# Patient Record
Sex: FEMALE | Race: BLACK OR AFRICAN AMERICAN | NOT HISPANIC OR LATINO
[De-identification: names, ages, dates, MRNs, and addresses within clinical notes are randomized per-mention and may not be internally consistent; named-entity substitution may affect disease eponyms.]

---

## 2019-09-03 PROBLEM — Z00.00 ENCOUNTER FOR PREVENTIVE HEALTH EXAMINATION: Status: ACTIVE | Noted: 2019-09-03

## 2019-09-23 ENCOUNTER — APPOINTMENT (OUTPATIENT)
Dept: NEUROSURGERY | Facility: CLINIC | Age: 64
End: 2019-09-23
Payer: COMMERCIAL

## 2019-09-23 VITALS
HEART RATE: 96 BPM | HEIGHT: 65 IN | SYSTOLIC BLOOD PRESSURE: 125 MMHG | DIASTOLIC BLOOD PRESSURE: 79 MMHG | WEIGHT: 158 LBS | BODY MASS INDEX: 26.33 KG/M2

## 2019-09-23 DIAGNOSIS — I10 ESSENTIAL (PRIMARY) HYPERTENSION: ICD-10-CM

## 2019-09-23 DIAGNOSIS — M48.062 SPINAL STENOSIS, LUMBAR REGION WITH NEUROGENIC CLAUDICATION: ICD-10-CM

## 2019-09-23 DIAGNOSIS — C80.1 MALIGNANT (PRIMARY) NEOPLASM, UNSPECIFIED: ICD-10-CM

## 2019-09-23 DIAGNOSIS — E11.9 TYPE 2 DIABETES MELLITUS W/OUT COMPLICATIONS: ICD-10-CM

## 2019-09-23 DIAGNOSIS — Z87.891 PERSONAL HISTORY OF NICOTINE DEPENDENCE: ICD-10-CM

## 2019-09-23 PROCEDURE — 99203 OFFICE O/P NEW LOW 30 MIN: CPT

## 2019-09-25 PROBLEM — E11.9 DIABETES: Status: RESOLVED | Noted: 2019-09-25 | Resolved: 2019-09-25

## 2019-09-25 PROBLEM — Z87.891 FORMER SMOKER: Status: ACTIVE | Noted: 2019-09-25

## 2019-09-25 PROBLEM — I10 HIGH BLOOD PRESSURE: Status: RESOLVED | Noted: 2019-09-25 | Resolved: 2019-09-25

## 2019-09-25 PROBLEM — C80.1 CANCER: Status: RESOLVED | Noted: 2019-09-25 | Resolved: 2019-09-25

## 2019-09-25 RX ORDER — CYCLOBENZAPRINE HYDROCHLORIDE 10 MG/1
10 TABLET, FILM COATED ORAL
Refills: 0 | Status: ACTIVE | COMMUNITY

## 2019-09-25 RX ORDER — SITAGLIPTIN AND METFORMIN HYDROCHLORIDE 50; 1000 MG/1; MG/1
50-1000 TABLET, FILM COATED ORAL
Refills: 0 | Status: ACTIVE | COMMUNITY

## 2019-09-25 RX ORDER — LETROZOLE TABLETS 2.5 MG/1
TABLET, FILM COATED ORAL
Refills: 0 | Status: ACTIVE | COMMUNITY

## 2019-09-25 RX ORDER — DICLOFENAC SODIUM 50 MG/1
50 TABLET, DELAYED RELEASE ORAL
Refills: 0 | Status: ACTIVE | COMMUNITY

## 2019-09-25 RX ORDER — PRAVASTATIN SODIUM 40 MG/1
40 TABLET ORAL
Refills: 0 | Status: ACTIVE | COMMUNITY

## 2019-09-25 RX ORDER — GUAIFENESIN 1200 MG/1
TABLET, EXTENDED RELEASE ORAL
Refills: 0 | Status: ACTIVE | COMMUNITY

## 2019-09-25 RX ORDER — CANDESARTAN CILEXETIL AND HYDROCHLOROTHIAZIDE 32; 12.5 MG/1; MG/1
32-12.5 TABLET ORAL
Refills: 0 | Status: ACTIVE | COMMUNITY

## 2019-09-25 RX ORDER — DAPAGLIFLOZIN 10 MG/1
TABLET, FILM COATED ORAL
Refills: 0 | Status: ACTIVE | COMMUNITY

## 2019-09-26 NOTE — HISTORY OF PRESENT ILLNESS
[de-identified] : Ms. KEELY GODOY is a 64 yo F with PMH of breast ca (s/p right lumpectomy and RT in 9/2016, currently on Letrozole), HTN, T2DM presents for initial consultation. She c/o chronic back pain for ~3 years which has been progressively worsening for the past several months. She describes pain as constant severe (10/10) pressure/tightness on her lower back radiating to left buttock down to lateral side of left leg and right distal calf with intermittent tingling/hot and cold sensation on b/l feet. Pain is worsened by walking and alleviated by resting. Previous/current treatments include physical therapy (tried 2 times which helped moderately), brace (no relief), medications(Tylenol #2, Alleve, Flexeril- minimal to no relief). MRI L-spine was ordered by PCP and referred for surgical consult. She denies headache, dizziness, visual changes, saddle anesthesia, weakness on LE, bowel/bladder dysfunction. She ambulates without an assistive device and performs ADLs independently.

## 2019-09-26 NOTE — REVIEW OF SYSTEMS
[As Noted in HPI] : as noted in HPI [Negative] : Integumentary [FreeTextEntry9] : back pain radiating to b/l LE (L>R)

## 2019-09-26 NOTE — DATA REVIEWED
[de-identified] : L-spine wo on 6/14/19 @ [facility] radiology [de-identified] : LS AP/Lat on 6/14/19 @ [facility] radiology

## 2019-09-26 NOTE — ASSESSMENT
[FreeTextEntry1] : This is a 62 yo female with chronic back pain with radiculopathy. She demonstrates mild dysesthesia on b/l toes but otherwise unremarkable. Recent MRI L-spine showed mild multilevel stenosis most prominent @L4-S1 with tarlov cyst. I discussed with the patient for recent stable image and recommend to continue conservative management at this time. \par - continue PT/medications\par - refer to pain management\par - EMG on LE for radiculopathy\par - RTC in 3 months after EMG for new/worsening symptoms

## 2019-09-26 NOTE — PHYSICAL EXAM
[General Appearance - In No Acute Distress] : in no acute distress [General Appearance - Alert] : alert [General Appearance - Well Nourished] : well nourished [] : normal voice and communication [Impaired Insight] : insight and judgment were intact [Oriented To Time, Place, And Person] : oriented to person, place, and time [Memory Recent] : recent memory was not impaired [Affect] : the affect was normal [Person] : oriented to person [Place] : oriented to place [Time] : oriented to time [Short Term Intact] : short term memory intact [Remote Intact] : remote memory intact [Span Intact] : the attention span was normal [Registration Intact] : recent registration memory intact [Concentration Intact] : normal concentrating ability [Fluency] : fluency intact [Comprehension] : comprehension intact [Reading] : reading intact [Current Events] : adequate knowledge of current events [Past History] : adequate knowledge of personal past history [Vocabulary] : adequate range of vocabulary [Cranial Nerves Optic (II)] : visual acuity intact bilaterally,  pupils equal round and reactive to light [Cranial Nerves Trigeminal (V)] : facial sensation intact symmetrically [Cranial Nerves Oculomotor (III)] : extraocular motion intact [Cranial Nerves Vestibulocochlear (VIII)] : hearing was intact bilaterally [Cranial Nerves Glossopharyngeal (IX)] : tongue and palate midline [Cranial Nerves Facial (VII)] : face symmetrical [Cranial Nerves Hypoglossal (XII)] : there was no tongue deviation with protrusion [Cranial Nerves Accessory (XI - Cranial And Spinal)] : head turning and shoulder shrug symmetric [Motor Strength] : muscle strength was normal in all four extremities [Romberg's Sign] : a positive Romberg's sign was present [Dysesthesia] : dysesthesia was present [Balance] : balance was intact [1+] : Patella left 1+ [No Visual Abnormalities] : no visible abnormailities [Able to toe walk] : the patient was able to toe walk [Normal] : normal [Able to heel walk] : the patient was able to heel walk [PERRL With Normal Accommodation] : pupils were equal in size, round, reactive to light, with normal accommodation [Extraocular Movements] : extraocular movements were intact [Hearing Threshold Finger Rub Not [place]] : hearing was normal [Examination Of The Oral Cavity] : the lips and gums were normal [Edema] : there was no peripheral edema [No CVA Tenderness] : no ~M costovertebral angle tenderness [Abnormal Walk] : normal gait [Motor Tone] : muscle strength and tone were normal [FreeTextEntry1] : moderate tenderness on lower back

## 2019-11-13 ENCOUNTER — APPOINTMENT (OUTPATIENT)
Dept: PAIN MANAGEMENT | Facility: CLINIC | Age: 64
End: 2019-11-13
Payer: COMMERCIAL

## 2019-11-13 VITALS
SYSTOLIC BLOOD PRESSURE: 89 MMHG | HEIGHT: 64 IN | HEART RATE: 102 BPM | WEIGHT: 153 LBS | BODY MASS INDEX: 26.12 KG/M2 | DIASTOLIC BLOOD PRESSURE: 60 MMHG

## 2019-11-13 VITALS — DIASTOLIC BLOOD PRESSURE: 72 MMHG | SYSTOLIC BLOOD PRESSURE: 113 MMHG | HEART RATE: 98 BPM

## 2019-11-13 PROCEDURE — 99215 OFFICE O/P EST HI 40 MIN: CPT

## 2019-11-13 RX ORDER — TOPIRAMATE 25 MG/1
25 CAPSULE, EXTENDED RELEASE ORAL DAILY
Qty: 60 | Refills: 1 | Status: ACTIVE | COMMUNITY
Start: 2019-11-13 | End: 1900-01-01

## 2019-11-21 NOTE — PHYSICAL EXAM
[Affect] : the affect was normal [General Appearance - Alert] : alert [Person] : oriented to person [Place] : oriented to place [Cranial Nerves Oculomotor (III)] : extraocular motion intact [Time] : oriented to time [Motor Tone] : muscle tone was normal in all four extremities [Motor Strength] : muscle strength was normal in all four extremities [Cranial Nerves Facial (VII)] : face symmetrical [Outer Ear] : the ears and nose were normal in appearance [Sclera] : the sclera and conjunctiva were normal [Neck Appearance] : the appearance of the neck was normal [] : no rash [FreeTextEntry1] : SLRT NEG BILATER AT 30 - 45 degrees; Palpable spasm and pain at Left lateral paraspinal region at L4-5 level

## 2019-11-21 NOTE — ASSESSMENT
[FreeTextEntry1] : L5 radiculopathy\par \par Non focal exam\par \par Consider local trigger point injection vs GARCÍA\par Discussed other treatment options.\par Recommend PT
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
## 2019-12-04 ENCOUNTER — APPOINTMENT (OUTPATIENT)
Dept: PAIN MANAGEMENT | Facility: CLINIC | Age: 64
End: 2019-12-04

## 2020-01-15 ENCOUNTER — APPOINTMENT (OUTPATIENT)
Dept: PAIN MANAGEMENT | Facility: CLINIC | Age: 65
End: 2020-01-15

## 2020-01-17 ENCOUNTER — APPOINTMENT (OUTPATIENT)
Dept: PAIN MANAGEMENT | Facility: CLINIC | Age: 65
End: 2020-01-17
Payer: COMMERCIAL

## 2020-01-17 VITALS — HEIGHT: 64 IN

## 2020-01-17 PROCEDURE — 99213 OFFICE O/P EST LOW 20 MIN: CPT | Mod: 25

## 2020-01-17 PROCEDURE — 20552 NJX 1/MLT TRIGGER POINT 1/2: CPT

## 2020-01-17 NOTE — ASSESSMENT
[FreeTextEntry1] : Trigger point injections with lidocaine only done today . \par pt tolerated injections well.\par RTO 1 month for repeat TPI . \par \par Dr Daniel in site , billed incident to service. \par

## 2020-01-17 NOTE — REVIEW OF SYSTEMS
[Fever] : no fever [Chills] : no chills [Palpitations] : no palpitations [Shortness Of Breath] : no shortness of breath [Chest Pain] : no chest pain [Dizziness] : no dizziness [Fainting] : no fainting

## 2020-01-17 NOTE — HISTORY OF PRESENT ILLNESS
[FreeTextEntry1] : Pt is here today for a trigger point injections to lower back , as per Dr Daniel. \par Pt has lower back pain that radiates down both legs. \par Discussed trigger point procedure and medication. \par Will hold steroid and just administer lidocaine as pt has DM. Pt is in agreement with this plan. \par Discussed possible adverse effects including bleeding , bruising , infection.\par All questions answered , consent was signed. \par

## 2020-01-17 NOTE — PHYSICAL EXAM
[General Appearance - Alert] : alert [General Appearance - Well-Appearing] : healthy appearing [] : normal voice and communication [Oriented To Time, Place, And Person] : oriented to person, place, and time [Affect] : the affect was normal [Mood] : the mood was normal [Sclera] : the sclera and conjunctiva were normal [Edema] : there was no peripheral edema [Abnormal Walk] : normal gait

## 2020-01-17 NOTE — PROCEDURE
[FreeTextEntry1] : Gloves donned .triggered points identified to bilateral lumbar paraspinal area by pt . Area cleaned with alcohol.\par 1.5cc of 1  Percent lidocaine administered to both sites. Band Aids applied .\par Pt monitored in office for 15 minutes and then walked out of office without complaint or issue. \par

## 2020-02-14 ENCOUNTER — APPOINTMENT (OUTPATIENT)
Dept: PAIN MANAGEMENT | Facility: CLINIC | Age: 65
End: 2020-02-14
Payer: COMMERCIAL

## 2020-02-14 VITALS
HEIGHT: 64 IN | SYSTOLIC BLOOD PRESSURE: 110 MMHG | DIASTOLIC BLOOD PRESSURE: 69 MMHG | WEIGHT: 150 LBS | BODY MASS INDEX: 25.61 KG/M2 | HEART RATE: 86 BPM

## 2020-02-14 PROCEDURE — 99213 OFFICE O/P EST LOW 20 MIN: CPT | Mod: 25

## 2020-02-14 PROCEDURE — 20552 NJX 1/MLT TRIGGER POINT 1/2: CPT

## 2020-02-14 RX ORDER — LIDOCAINE 5% 700 MG/1
5 PATCH TOPICAL
Qty: 30 | Refills: 1 | Status: ACTIVE | COMMUNITY
Start: 2020-02-14 | End: 1900-01-01

## 2020-02-18 NOTE — HISTORY OF PRESENT ILLNESS
[FreeTextEntry1] : Patient returns today for trigger point injection to lumbar spine area. \par She tolerated the injections well last month and reported decreased pain over the course of this month . \par Denies any adverse reactions. \par Pt would liketo go forward with TPI today . \par All questions answered . Consent signed.

## 2020-03-10 ENCOUNTER — APPOINTMENT (OUTPATIENT)
Dept: PAIN MANAGEMENT | Facility: CLINIC | Age: 65
End: 2020-03-10
Payer: COMMERCIAL

## 2020-03-10 VITALS
WEIGHT: 148 LBS | HEIGHT: 64 IN | HEART RATE: 78 BPM | SYSTOLIC BLOOD PRESSURE: 92 MMHG | BODY MASS INDEX: 25.27 KG/M2 | DIASTOLIC BLOOD PRESSURE: 61 MMHG

## 2020-03-10 PROCEDURE — 99213 OFFICE O/P EST LOW 20 MIN: CPT

## 2020-03-10 NOTE — ASSESSMENT
[FreeTextEntry1] : Will hold off today on doing TPI .\par Pt to consult with PCP regarding BP .\par Hold off on using Lidocaine patches as well.\par \par Dr Daniel on site , billed incident to service . \par \par \par RTO after speaking with PCP re: BP

## 2020-03-10 NOTE — HISTORY OF PRESENT ILLNESS
[FreeTextEntry1] : Pt is here today requesting TPI to lower back . \par she reports it has been helpful for her  lower back pain and leg pain . \par However her BP today is 92/60. I advised pt I am not comfortable proceeding with the injection due to the risk of hypotension. Pt verbalized understanding. I advised pt to contact her PCP to discuss.

## 2020-03-10 NOTE — REVIEW OF SYSTEMS
[Fever] : no fever [Chills] : no chills [Chest Pain] : no chest pain [Shortness Of Breath] : no shortness of breath [Dizziness] : no dizziness [Fainting] : no fainting

## 2020-03-10 NOTE — PHYSICAL EXAM
[General Appearance - Alert] : alert [General Appearance - In No Acute Distress] : in no acute distress [General Appearance - Well-Appearing] : healthy appearing [] : normal voice and communication [Oriented To Time, Place, And Person] : oriented to person, place, and time [Affect] : the affect was normal [Mood] : the mood was normal [Motor Strength Lower Extremities Bilaterally] : strength was normal in both lower extremities [Sclera] : the sclera and conjunctiva were normal [Edema] : there was no peripheral edema

## 2023-02-20 ENCOUNTER — INPATIENT (INPATIENT)
Facility: HOSPITAL | Age: 68
LOS: 0 days | Discharge: HOME | DRG: 392 | End: 2023-02-21
Attending: INTERNAL MEDICINE | Admitting: STUDENT IN AN ORGANIZED HEALTH CARE EDUCATION/TRAINING PROGRAM
Payer: COMMERCIAL

## 2023-02-20 VITALS
WEIGHT: 156.09 LBS | SYSTOLIC BLOOD PRESSURE: 119 MMHG | DIASTOLIC BLOOD PRESSURE: 78 MMHG | HEIGHT: 64 IN | RESPIRATION RATE: 20 BRPM | TEMPERATURE: 99 F | HEART RATE: 101 BPM | OXYGEN SATURATION: 97 %

## 2023-02-20 LAB
ALBUMIN SERPL ELPH-MCNC: 4.2 G/DL — SIGNIFICANT CHANGE UP (ref 3.3–5)
ALP SERPL-CCNC: 85 U/L — SIGNIFICANT CHANGE UP (ref 40–120)
ALT FLD-CCNC: 24 U/L — SIGNIFICANT CHANGE UP (ref 10–45)
ANION GAP SERPL CALC-SCNC: 15 MMOL/L — SIGNIFICANT CHANGE UP (ref 5–17)
APTT BLD: 25.3 SEC — LOW (ref 27.5–35.5)
AST SERPL-CCNC: 37 U/L — SIGNIFICANT CHANGE UP (ref 10–40)
BASE EXCESS BLDV CALC-SCNC: 3 MMOL/L — SIGNIFICANT CHANGE UP (ref -2–3)
BASOPHILS # BLD AUTO: 0 K/UL — SIGNIFICANT CHANGE UP (ref 0–0.2)
BASOPHILS NFR BLD AUTO: 0 % — SIGNIFICANT CHANGE UP (ref 0–2)
BILIRUB SERPL-MCNC: 0.5 MG/DL — SIGNIFICANT CHANGE UP (ref 0.2–1.2)
BLOOD GAS VENOUS - CREATININE: SIGNIFICANT CHANGE UP MG/DL (ref 0.5–1.3)
BUN SERPL-MCNC: 11 MG/DL — SIGNIFICANT CHANGE UP (ref 7–23)
CA-I SERPL-SCNC: 1.16 MMOL/L — SIGNIFICANT CHANGE UP (ref 1.15–1.33)
CALCIUM SERPL-MCNC: 9.4 MG/DL — SIGNIFICANT CHANGE UP (ref 8.4–10.5)
CHLORIDE BLDV-SCNC: 101 MMOL/L — SIGNIFICANT CHANGE UP (ref 96–108)
CHLORIDE SERPL-SCNC: 102 MMOL/L — SIGNIFICANT CHANGE UP (ref 96–108)
CO2 BLDV-SCNC: 31 MMOL/L — HIGH (ref 22–26)
CO2 SERPL-SCNC: 24 MMOL/L — SIGNIFICANT CHANGE UP (ref 22–31)
CREAT SERPL-MCNC: 0.54 MG/DL — SIGNIFICANT CHANGE UP (ref 0.5–1.3)
EGFR: 101 ML/MIN/1.73M2 — SIGNIFICANT CHANGE UP
EOSINOPHIL # BLD AUTO: 0.04 K/UL — SIGNIFICANT CHANGE UP (ref 0–0.5)
EOSINOPHIL NFR BLD AUTO: 0.8 % — SIGNIFICANT CHANGE UP (ref 0–6)
GAS PNL BLDV: 136 MMOL/L — SIGNIFICANT CHANGE UP (ref 136–145)
GAS PNL BLDV: SIGNIFICANT CHANGE UP
GAS PNL BLDV: SIGNIFICANT CHANGE UP
GLUCOSE BLDV-MCNC: 129 MG/DL — HIGH (ref 70–99)
GLUCOSE SERPL-MCNC: 129 MG/DL — HIGH (ref 70–99)
HCO3 BLDV-SCNC: 30 MMOL/L — HIGH (ref 22–29)
HCT VFR BLD CALC: 47.3 % — HIGH (ref 34.5–45)
HCT VFR BLDA CALC: 47 % — HIGH (ref 34.5–46.5)
HGB BLD CALC-MCNC: 15.7 G/DL — SIGNIFICANT CHANGE UP (ref 11.7–16.1)
HGB BLD-MCNC: 15.4 G/DL — SIGNIFICANT CHANGE UP (ref 11.5–15.5)
INR BLD: 1.27 RATIO — HIGH (ref 0.88–1.16)
LACTATE BLDV-MCNC: 2 MMOL/L — SIGNIFICANT CHANGE UP (ref 0.5–2)
LIDOCAIN IGE QN: 41 U/L — SIGNIFICANT CHANGE UP (ref 7–60)
LYMPHOCYTES # BLD AUTO: 2.45 K/UL — SIGNIFICANT CHANGE UP (ref 1–3.3)
LYMPHOCYTES # BLD AUTO: 43.7 % — SIGNIFICANT CHANGE UP (ref 13–44)
MANUAL SMEAR VERIFICATION: SIGNIFICANT CHANGE UP
MCHC RBC-ENTMCNC: 28.2 PG — SIGNIFICANT CHANGE UP (ref 27–34)
MCHC RBC-ENTMCNC: 32.6 GM/DL — SIGNIFICANT CHANGE UP (ref 32–36)
MCV RBC AUTO: 86.6 FL — SIGNIFICANT CHANGE UP (ref 80–100)
METAMYELOCYTES # FLD: 3.4 % — HIGH (ref 0–0)
MONOCYTES # BLD AUTO: 0.85 K/UL — SIGNIFICANT CHANGE UP (ref 0–0.9)
MONOCYTES NFR BLD AUTO: 15.1 % — HIGH (ref 2–14)
NEUTROPHILS # BLD AUTO: 2.08 K/UL — SIGNIFICANT CHANGE UP (ref 1.8–7.4)
NEUTROPHILS NFR BLD AUTO: 18.5 % — LOW (ref 43–77)
NEUTS BAND # BLD: 18.5 % — HIGH (ref 0–8)
PCO2 BLDV: 51 MMHG — HIGH (ref 39–42)
PH BLDV: 7.37 — SIGNIFICANT CHANGE UP (ref 7.32–7.43)
PLAT MORPH BLD: NORMAL — SIGNIFICANT CHANGE UP
PLATELET # BLD AUTO: 247 K/UL — SIGNIFICANT CHANGE UP (ref 150–400)
PO2 BLDV: 34 MMHG — SIGNIFICANT CHANGE UP (ref 25–45)
POTASSIUM BLDV-SCNC: 3.5 MMOL/L — SIGNIFICANT CHANGE UP (ref 3.5–5.1)
POTASSIUM SERPL-MCNC: 4.3 MMOL/L — SIGNIFICANT CHANGE UP (ref 3.5–5.3)
POTASSIUM SERPL-SCNC: 4.3 MMOL/L — SIGNIFICANT CHANGE UP (ref 3.5–5.3)
PROT SERPL-MCNC: 7.6 G/DL — SIGNIFICANT CHANGE UP (ref 6–8.3)
PROTHROM AB SERPL-ACNC: 14.6 SEC — HIGH (ref 10.5–13.4)
RBC # BLD: 5.46 M/UL — HIGH (ref 3.8–5.2)
RBC # FLD: 12.6 % — SIGNIFICANT CHANGE UP (ref 10.3–14.5)
RBC BLD AUTO: SIGNIFICANT CHANGE UP
SAO2 % BLDV: 58.6 % — LOW (ref 67–88)
SODIUM SERPL-SCNC: 141 MMOL/L — SIGNIFICANT CHANGE UP (ref 135–145)
WBC # BLD: 5.61 K/UL — SIGNIFICANT CHANGE UP (ref 3.8–10.5)
WBC # FLD AUTO: 5.61 K/UL — SIGNIFICANT CHANGE UP (ref 3.8–10.5)

## 2023-02-20 PROCEDURE — 99285 EMERGENCY DEPT VISIT HI MDM: CPT

## 2023-02-20 PROCEDURE — 74177 CT ABD & PELVIS W/CONTRAST: CPT | Mod: 26,MA

## 2023-02-20 RX ORDER — ONDANSETRON 8 MG/1
4 TABLET, FILM COATED ORAL ONCE
Refills: 0 | Status: COMPLETED | OUTPATIENT
Start: 2023-02-20 | End: 2023-02-20

## 2023-02-20 RX ORDER — PIPERACILLIN AND TAZOBACTAM 4; .5 G/20ML; G/20ML
3.38 INJECTION, POWDER, LYOPHILIZED, FOR SOLUTION INTRAVENOUS ONCE
Refills: 0 | Status: COMPLETED | OUTPATIENT
Start: 2023-02-20 | End: 2023-02-20

## 2023-02-20 RX ORDER — SODIUM CHLORIDE 9 MG/ML
1000 INJECTION, SOLUTION INTRAVENOUS ONCE
Refills: 0 | Status: COMPLETED | OUTPATIENT
Start: 2023-02-20 | End: 2023-02-20

## 2023-02-20 RX ORDER — ACETAMINOPHEN 500 MG
1000 TABLET ORAL ONCE
Refills: 0 | Status: COMPLETED | OUTPATIENT
Start: 2023-02-20 | End: 2023-02-20

## 2023-02-20 RX ADMIN — ONDANSETRON 4 MILLIGRAM(S): 8 TABLET, FILM COATED ORAL at 21:38

## 2023-02-20 RX ADMIN — Medication 400 MILLIGRAM(S): at 23:35

## 2023-02-20 NOTE — ED PROVIDER NOTE - RAPID ASSESSMENT
67F w/ PSHx hysterectomy and PMHx of  HTN and DM presents to ED c/o vomiting a/w weakness onset this Fri. Pt states she has lower abd pain; last bowel movement was yesterday and has not had any flatulence.     note: The scribe (Oumou Gustafson)'s documentation has been prepared under my direction and personally reviewed by me.  Patient was seen as a QPA patient, a thorough physical exam was not performed. The patient will be seen and further worked up in the main emergency department and their care will be completed by the main emergency department team. Receiving team will follow up on labs, analgesia, any clinical imaging, reassess and disposition as clinically indicated, all decisions regarding the progression of care will be made at their discretion. Seen by Linda Marquis (PA) and Oumou Gustafson (Scribe). 67F w/ PSHx hysterectomy and PMHx of  HTN and DM presents to ED c/o vomiting and abdominal pain beginning 3 days ago. Pt states she has upper abd pain. last bowel movement was yesterday and has not had any flatulence. no fever. tolerating liquids without emesis.     note: The scribe (Oumou Gustafson)'s documentation has been prepared under my direction and personally reviewed by me.  Patient was seen as a QPA patient, a thorough physical exam was not performed. The patient will be seen and further worked up in the main emergency department and their care will be completed by the main emergency department team. Receiving team will follow up on labs, analgesia, any clinical imaging, reassess and disposition as clinically indicated, all decisions regarding the progression of care will be made at their discretion. Seen by Linda Marquis (PA) and Oumou Gustafson (Scribe).    Linda Marquis PA-C: The scribe's documentation has been prepared under my direction and personally reviewed by me in its entirety. I confirm that the note above accurately reflects history obtained.   Patient was rapidly assessed via telemedicine encounter; a limited history was obtained. The patient will be seen and further examined and worked up in the main ED and their care will be completed by the main ED team. Receiving team will follow up on labs, analgesia, any clinical imaging, and perform reassessment and disposition of the patient as clinically indicated. All decisions regarding the progression of care will be made at their discretion. 67F w/ PSHx hysterectomy and PMHx of  HTN and DM presents to ED c/o vomiting and abdominal pain beginning 3 days ago. Pt states she has upper abd pain. last bowel movement was yesterday and has not had any flatulence. no fever. tolerating liquids without emesis.     note: The scribe (Oumou Gustafson)'s documentation has been prepared under my direction and personally reviewed by me.  Patient was seen as a QPA patient, a thorough physical exam was not performed. The patient will be seen and further worked up in the main emergency department and their care will be completed by the main emergency department team. Receiving team will follow up on labs, analgesia, any clinical imaging, reassess and disposition as clinically indicated, all decisions regarding the progression of care will be made at their discretion. Seen by Linda Marquis (PA) and Oumou Gustafson (Scribe).    iLnda Marquis PA-C: The scribe's documentation has been prepared under my direction and personally reviewed by me in its entirety. I confirm that the note above accurately reflects history obtained.   Patient was rapidly assessed via telemedicine encounter; a limited history was obtained. The patient will be seen and further examined and worked up in the main ED and their care will be completed by the main ED team. Receiving team will follow up on labs, analgesia, any clinical imaging, and perform reassessment and disposition of the patient as clinically indicated. All decisions regarding the progression of care will be made at their discretion.  Patient seen by [facility]- Dr. Mcdermott available for questions regarding this patient and no questions were asked.

## 2023-02-20 NOTE — ED PROVIDER NOTE - NS_BEDUNITTYPES_ED_ALL_ED
Writer spoke with pharmacist, they had the order for Omnicef and they will fill it today.  Writer explained from 11/9/21 encounter that Tatum at [facility] was going to call clinic back because she did not know which pharmacy to fax order to.  Pharmacist verbalized understanding and they will fill the order today   MEDICINE

## 2023-02-20 NOTE — ED PROVIDER NOTE - OBJECTIVE STATEMENT
Attendin-year-old female presents with abdominal pain since Friday.  She is also feeling constipated.  Her last bowel movement was yesterday.  She feels bloated.  Her pain is mostly on the right side and radiates from the right upper down to the right lower quadrant.  She has a past surgical history of hysterectomy.  Her past medical history is significant for diabetes and hypertension.  She has decreased appetite today.  She had an episode of vomiting here in the waiting room while waiting to be seen.  There is no fever.  No diarrhea.

## 2023-02-20 NOTE — ED PROVIDER NOTE - CLINICAL SUMMARY MEDICAL DECISION MAKING FREE TEXT BOX
Abdominal pain, decreased po intake, nausea/vomiting.  will get CT abdomen and pelvis to evaluate for intraabdominal pathology including SBO, diverticulitis, appendicitis or other inflammatory pathology.  will treat symptoms and reassess.

## 2023-02-21 ENCOUNTER — TRANSCRIPTION ENCOUNTER (OUTPATIENT)
Age: 68
End: 2023-02-21

## 2023-02-21 VITALS
DIASTOLIC BLOOD PRESSURE: 68 MMHG | HEART RATE: 83 BPM | RESPIRATION RATE: 18 BRPM | OXYGEN SATURATION: 98 % | SYSTOLIC BLOOD PRESSURE: 121 MMHG

## 2023-02-21 DIAGNOSIS — E11.9 TYPE 2 DIABETES MELLITUS WITHOUT COMPLICATIONS: ICD-10-CM

## 2023-02-21 DIAGNOSIS — N81.10 CYSTOCELE, UNSPECIFIED: Chronic | ICD-10-CM

## 2023-02-21 DIAGNOSIS — Z90.710 ACQUIRED ABSENCE OF BOTH CERVIX AND UTERUS: Chronic | ICD-10-CM

## 2023-02-21 DIAGNOSIS — I10 ESSENTIAL (PRIMARY) HYPERTENSION: ICD-10-CM

## 2023-02-21 DIAGNOSIS — K52.9 NONINFECTIVE GASTROENTERITIS AND COLITIS, UNSPECIFIED: ICD-10-CM

## 2023-02-21 DIAGNOSIS — Z29.9 ENCOUNTER FOR PROPHYLACTIC MEASURES, UNSPECIFIED: ICD-10-CM

## 2023-02-21 DIAGNOSIS — E78.5 HYPERLIPIDEMIA, UNSPECIFIED: ICD-10-CM

## 2023-02-21 LAB
A1C WITH ESTIMATED AVERAGE GLUCOSE RESULT: 6.9 % — HIGH (ref 4–5.6)
ANION GAP SERPL CALC-SCNC: 11 MMOL/L — SIGNIFICANT CHANGE UP (ref 5–17)
ANION GAP SERPL CALC-SCNC: 15 MMOL/L — SIGNIFICANT CHANGE UP (ref 5–17)
APPEARANCE UR: CLEAR — SIGNIFICANT CHANGE UP
APTT BLD: 22.3 SEC — LOW (ref 27.5–35.5)
BACTERIA # UR AUTO: NEGATIVE — SIGNIFICANT CHANGE UP
BILIRUB UR-MCNC: NEGATIVE — SIGNIFICANT CHANGE UP
BUN SERPL-MCNC: 7 MG/DL — SIGNIFICANT CHANGE UP (ref 7–23)
BUN SERPL-MCNC: 8 MG/DL — SIGNIFICANT CHANGE UP (ref 7–23)
CALCIUM SERPL-MCNC: 5.5 MG/DL — CRITICAL LOW (ref 8.4–10.5)
CALCIUM SERPL-MCNC: 8.7 MG/DL — SIGNIFICANT CHANGE UP (ref 8.4–10.5)
CHLORIDE SERPL-SCNC: 104 MMOL/L — SIGNIFICANT CHANGE UP (ref 96–108)
CHLORIDE SERPL-SCNC: 117 MMOL/L — HIGH (ref 96–108)
CO2 SERPL-SCNC: 16 MMOL/L — LOW (ref 22–31)
CO2 SERPL-SCNC: 24 MMOL/L — SIGNIFICANT CHANGE UP (ref 22–31)
COLOR SPEC: SIGNIFICANT CHANGE UP
CREAT SERPL-MCNC: 0.33 MG/DL — LOW (ref 0.5–1.3)
CREAT SERPL-MCNC: 0.56 MG/DL — SIGNIFICANT CHANGE UP (ref 0.5–1.3)
DIFF PNL FLD: NEGATIVE — SIGNIFICANT CHANGE UP
EGFR: 100 ML/MIN/1.73M2 — SIGNIFICANT CHANGE UP
EGFR: 114 ML/MIN/1.73M2 — SIGNIFICANT CHANGE UP
EPI CELLS # UR: 7 /HPF — HIGH
ESTIMATED AVERAGE GLUCOSE: 151 MG/DL — HIGH (ref 68–114)
GLUCOSE SERPL-MCNC: 67 MG/DL — LOW (ref 70–99)
GLUCOSE SERPL-MCNC: 84 MG/DL — SIGNIFICANT CHANGE UP (ref 70–99)
GLUCOSE UR QL: ABNORMAL
GRAN CASTS # UR COMP ASSIST: 1 /LPF — SIGNIFICANT CHANGE UP
HCT VFR BLD CALC: 39.4 % — SIGNIFICANT CHANGE UP (ref 34.5–45)
HGB BLD-MCNC: 13.1 G/DL — SIGNIFICANT CHANGE UP (ref 11.5–15.5)
HYALINE CASTS # UR AUTO: 2 /LPF — SIGNIFICANT CHANGE UP (ref 0–7)
INR BLD: 1.34 RATIO — HIGH (ref 0.88–1.16)
KETONES UR-MCNC: ABNORMAL
LEUKOCYTE ESTERASE UR-ACNC: NEGATIVE — SIGNIFICANT CHANGE UP
MAGNESIUM SERPL-MCNC: 1.1 MG/DL — LOW (ref 1.6–2.6)
MAGNESIUM SERPL-MCNC: 1.8 MG/DL — SIGNIFICANT CHANGE UP (ref 1.6–2.6)
MCHC RBC-ENTMCNC: 29 PG — SIGNIFICANT CHANGE UP (ref 27–34)
MCHC RBC-ENTMCNC: 33.2 GM/DL — SIGNIFICANT CHANGE UP (ref 32–36)
MCV RBC AUTO: 87.4 FL — SIGNIFICANT CHANGE UP (ref 80–100)
NITRITE UR-MCNC: NEGATIVE — SIGNIFICANT CHANGE UP
NRBC # BLD: 0 /100 WBCS — SIGNIFICANT CHANGE UP (ref 0–0)
PH UR: 6.5 — SIGNIFICANT CHANGE UP (ref 5–8)
PHOSPHATE SERPL-MCNC: 1.5 MG/DL — LOW (ref 2.5–4.5)
PHOSPHATE SERPL-MCNC: 2.3 MG/DL — LOW (ref 2.5–4.5)
PLATELET # BLD AUTO: 189 K/UL — SIGNIFICANT CHANGE UP (ref 150–400)
POTASSIUM SERPL-MCNC: 2.3 MMOL/L — CRITICAL LOW (ref 3.5–5.3)
POTASSIUM SERPL-MCNC: 3.4 MMOL/L — LOW (ref 3.5–5.3)
POTASSIUM SERPL-SCNC: 2.3 MMOL/L — CRITICAL LOW (ref 3.5–5.3)
POTASSIUM SERPL-SCNC: 3.4 MMOL/L — LOW (ref 3.5–5.3)
PROT UR-MCNC: ABNORMAL
PROTHROM AB SERPL-ACNC: 15.5 SEC — HIGH (ref 10.5–13.4)
RBC # BLD: 4.51 M/UL — SIGNIFICANT CHANGE UP (ref 3.8–5.2)
RBC # FLD: 12.4 % — SIGNIFICANT CHANGE UP (ref 10.3–14.5)
RBC CASTS # UR COMP ASSIST: 0 /HPF — SIGNIFICANT CHANGE UP (ref 0–4)
SARS-COV-2 RNA SPEC QL NAA+PROBE: SIGNIFICANT CHANGE UP
SODIUM SERPL-SCNC: 143 MMOL/L — SIGNIFICANT CHANGE UP (ref 135–145)
SODIUM SERPL-SCNC: 144 MMOL/L — SIGNIFICANT CHANGE UP (ref 135–145)
SP GR SPEC: >1.05 (ref 1.01–1.02)
UROBILINOGEN FLD QL: NEGATIVE — SIGNIFICANT CHANGE UP
WBC # BLD: 4.22 K/UL — SIGNIFICANT CHANGE UP (ref 3.8–10.5)
WBC # FLD AUTO: 4.22 K/UL — SIGNIFICANT CHANGE UP (ref 3.8–10.5)
WBC UR QL: 4 /HPF — SIGNIFICANT CHANGE UP (ref 0–5)

## 2023-02-21 PROCEDURE — 87086 URINE CULTURE/COLONY COUNT: CPT

## 2023-02-21 PROCEDURE — 74177 CT ABD & PELVIS W/CONTRAST: CPT | Mod: MA

## 2023-02-21 PROCEDURE — 84295 ASSAY OF SERUM SODIUM: CPT

## 2023-02-21 PROCEDURE — 99223 1ST HOSP IP/OBS HIGH 75: CPT | Mod: GC

## 2023-02-21 PROCEDURE — 81001 URINALYSIS AUTO W/SCOPE: CPT

## 2023-02-21 PROCEDURE — 83735 ASSAY OF MAGNESIUM: CPT

## 2023-02-21 PROCEDURE — 82435 ASSAY OF BLOOD CHLORIDE: CPT

## 2023-02-21 PROCEDURE — 83690 ASSAY OF LIPASE: CPT

## 2023-02-21 PROCEDURE — 85014 HEMATOCRIT: CPT

## 2023-02-21 PROCEDURE — 85610 PROTHROMBIN TIME: CPT

## 2023-02-21 PROCEDURE — 82947 ASSAY GLUCOSE BLOOD QUANT: CPT

## 2023-02-21 PROCEDURE — 85025 COMPLETE CBC W/AUTO DIFF WBC: CPT

## 2023-02-21 PROCEDURE — 84132 ASSAY OF SERUM POTASSIUM: CPT

## 2023-02-21 PROCEDURE — 83036 HEMOGLOBIN GLYCOSYLATED A1C: CPT

## 2023-02-21 PROCEDURE — 99285 EMERGENCY DEPT VISIT HI MDM: CPT

## 2023-02-21 PROCEDURE — 84100 ASSAY OF PHOSPHORUS: CPT

## 2023-02-21 PROCEDURE — 82565 ASSAY OF CREATININE: CPT

## 2023-02-21 PROCEDURE — 87040 BLOOD CULTURE FOR BACTERIA: CPT

## 2023-02-21 PROCEDURE — 87635 SARS-COV-2 COVID-19 AMP PRB: CPT

## 2023-02-21 PROCEDURE — 85018 HEMOGLOBIN: CPT

## 2023-02-21 PROCEDURE — 85730 THROMBOPLASTIN TIME PARTIAL: CPT

## 2023-02-21 PROCEDURE — 82803 BLOOD GASES ANY COMBINATION: CPT

## 2023-02-21 PROCEDURE — 80048 BASIC METABOLIC PNL TOTAL CA: CPT

## 2023-02-21 PROCEDURE — 80053 COMPREHEN METABOLIC PANEL: CPT

## 2023-02-21 PROCEDURE — 36415 COLL VENOUS BLD VENIPUNCTURE: CPT

## 2023-02-21 PROCEDURE — 83605 ASSAY OF LACTIC ACID: CPT

## 2023-02-21 PROCEDURE — 82330 ASSAY OF CALCIUM: CPT

## 2023-02-21 RX ORDER — GLUCAGON INJECTION, SOLUTION 0.5 MG/.1ML
1 INJECTION, SOLUTION SUBCUTANEOUS ONCE
Refills: 0 | Status: DISCONTINUED | OUTPATIENT
Start: 2023-02-21 | End: 2023-02-21

## 2023-02-21 RX ORDER — SODIUM CHLORIDE 9 MG/ML
1000 INJECTION, SOLUTION INTRAVENOUS
Refills: 0 | Status: DISCONTINUED | OUTPATIENT
Start: 2023-02-21 | End: 2023-02-21

## 2023-02-21 RX ORDER — ONDANSETRON 8 MG/1
4 TABLET, FILM COATED ORAL EVERY 8 HOURS
Refills: 0 | Status: DISCONTINUED | OUTPATIENT
Start: 2023-02-21 | End: 2023-02-21

## 2023-02-21 RX ORDER — INSULIN LISPRO 100/ML
VIAL (ML) SUBCUTANEOUS
Refills: 0 | Status: DISCONTINUED | OUTPATIENT
Start: 2023-02-21 | End: 2023-02-21

## 2023-02-21 RX ORDER — OMEPRAZOLE 10 MG/1
1 CAPSULE, DELAYED RELEASE ORAL
Qty: 0 | Refills: 0 | DISCHARGE

## 2023-02-21 RX ORDER — POTASSIUM CHLORIDE 20 MEQ
20 PACKET (EA) ORAL ONCE
Refills: 0 | Status: COMPLETED | OUTPATIENT
Start: 2023-02-21 | End: 2023-02-21

## 2023-02-21 RX ORDER — DEXTROSE 50 % IN WATER 50 %
15 SYRINGE (ML) INTRAVENOUS ONCE
Refills: 0 | Status: DISCONTINUED | OUTPATIENT
Start: 2023-02-21 | End: 2023-02-21

## 2023-02-21 RX ORDER — ATORVASTATIN CALCIUM 80 MG/1
10 TABLET, FILM COATED ORAL AT BEDTIME
Refills: 0 | Status: DISCONTINUED | OUTPATIENT
Start: 2023-02-21 | End: 2023-02-21

## 2023-02-21 RX ORDER — METFORMIN HYDROCHLORIDE 850 MG/1
1 TABLET ORAL
Qty: 0 | Refills: 0 | DISCHARGE

## 2023-02-21 RX ORDER — LANOLIN ALCOHOL/MO/W.PET/CERES
3 CREAM (GRAM) TOPICAL AT BEDTIME
Refills: 0 | Status: DISCONTINUED | OUTPATIENT
Start: 2023-02-21 | End: 2023-02-21

## 2023-02-21 RX ORDER — DEXTROSE 50 % IN WATER 50 %
25 SYRINGE (ML) INTRAVENOUS ONCE
Refills: 0 | Status: DISCONTINUED | OUTPATIENT
Start: 2023-02-21 | End: 2023-02-21

## 2023-02-21 RX ORDER — POTASSIUM PHOSPHATE, MONOBASIC POTASSIUM PHOSPHATE, DIBASIC 236; 224 MG/ML; MG/ML
15 INJECTION, SOLUTION INTRAVENOUS ONCE
Refills: 0 | Status: DISCONTINUED | OUTPATIENT
Start: 2023-02-21 | End: 2023-02-21

## 2023-02-21 RX ORDER — INSULIN LISPRO 100/ML
VIAL (ML) SUBCUTANEOUS AT BEDTIME
Refills: 0 | Status: DISCONTINUED | OUTPATIENT
Start: 2023-02-21 | End: 2023-02-21

## 2023-02-21 RX ORDER — ENOXAPARIN SODIUM 100 MG/ML
40 INJECTION SUBCUTANEOUS EVERY 24 HOURS
Refills: 0 | Status: DISCONTINUED | OUTPATIENT
Start: 2023-02-21 | End: 2023-02-21

## 2023-02-21 RX ORDER — IBANDRONATE SODIUM 150 MG/1
1 TABLET ORAL
Qty: 0 | Refills: 0 | DISCHARGE

## 2023-02-21 RX ORDER — FERROUS SULFATE 325(65) MG
1 TABLET ORAL
Qty: 0 | Refills: 0 | DISCHARGE

## 2023-02-21 RX ORDER — LOSARTAN POTASSIUM 100 MG/1
100 TABLET, FILM COATED ORAL DAILY
Refills: 0 | Status: DISCONTINUED | OUTPATIENT
Start: 2023-02-21 | End: 2023-02-21

## 2023-02-21 RX ORDER — ACETAMINOPHEN 500 MG
650 TABLET ORAL EVERY 6 HOURS
Refills: 0 | Status: DISCONTINUED | OUTPATIENT
Start: 2023-02-21 | End: 2023-02-21

## 2023-02-21 RX ORDER — CANDESARTAN CILEXETIL 8 MG/1
1 TABLET ORAL
Qty: 0 | Refills: 0 | DISCHARGE

## 2023-02-21 RX ORDER — PANTOPRAZOLE SODIUM 20 MG/1
40 TABLET, DELAYED RELEASE ORAL
Refills: 0 | Status: DISCONTINUED | OUTPATIENT
Start: 2023-02-21 | End: 2023-02-21

## 2023-02-21 RX ORDER — DEXTROSE 50 % IN WATER 50 %
12.5 SYRINGE (ML) INTRAVENOUS ONCE
Refills: 0 | Status: DISCONTINUED | OUTPATIENT
Start: 2023-02-21 | End: 2023-02-21

## 2023-02-21 RX ORDER — ERTUGLIFLOZIN 5 MG/1
1 TABLET, FILM COATED ORAL
Qty: 0 | Refills: 0 | DISCHARGE

## 2023-02-21 RX ADMIN — Medication 650 MILLIGRAM(S): at 09:54

## 2023-02-21 RX ADMIN — PANTOPRAZOLE SODIUM 40 MILLIGRAM(S): 20 TABLET, DELAYED RELEASE ORAL at 08:28

## 2023-02-21 RX ADMIN — ENOXAPARIN SODIUM 40 MILLIGRAM(S): 100 INJECTION SUBCUTANEOUS at 08:29

## 2023-02-21 RX ADMIN — PIPERACILLIN AND TAZOBACTAM 200 GRAM(S): 4; .5 INJECTION, POWDER, LYOPHILIZED, FOR SOLUTION INTRAVENOUS at 00:15

## 2023-02-21 RX ADMIN — LOSARTAN POTASSIUM 100 MILLIGRAM(S): 100 TABLET, FILM COATED ORAL at 10:31

## 2023-02-21 RX ADMIN — Medication 20 MILLIEQUIVALENT(S): at 11:21

## 2023-02-21 RX ADMIN — Medication 650 MILLIGRAM(S): at 08:29

## 2023-02-21 RX ADMIN — SODIUM CHLORIDE 1000 MILLILITER(S): 9 INJECTION, SOLUTION INTRAVENOUS at 00:15

## 2023-02-21 RX ADMIN — Medication 1000 MILLIGRAM(S): at 08:14

## 2023-02-21 NOTE — H&P ADULT - NSHPPHYSICALEXAM_GEN_ALL_CORE
VITALS:   Vital Signs Last 24 Hrs  T(C): 36.7 (21 Feb 2023 00:53), Max: 37.3 (20 Feb 2023 16:46)  T(F): 98.1 (21 Feb 2023 00:53), Max: 99.2 (20 Feb 2023 16:46)  HR: 89 (21 Feb 2023 00:53) (89 - 101)  BP: 101/70 (21 Feb 2023 00:53) (101/70 - 119/78)  BP(mean): --  RR: 18 (21 Feb 2023 00:53) (18 - 20)  SpO2: 97% (21 Feb 2023 00:53) (97% - 97%)    Parameters below as of 21 Feb 2023 00:53  Patient On (Oxygen Delivery Method): room air    I&O's Summary  CAPILLARY BLOOD GLUCOSE      Physical Exam:  General: NAD, lying comfortably in stretcher   HEENT: PERRL, EOMi, no scleral icterus  CV: RRR, normal S1 and S2  Lungs: normal respiratory effort on RA, CTAB, no wheezes, rales, or rhonchi  Abd: soft, mild RLQ ttp, nondistended, no guarding/rebound   Ext: no edema, 2+ peripheral pulses   Pysch: AAOx3, appropriate affect   Neuro: grossly non-focal, moving all extremities spontaneously   Skin: no rashes or lesions

## 2023-02-21 NOTE — ED ADULT NURSE NOTE - ED STAT RN HANDOFF DETAILS
Report endorsed to isidro Gandara RN. Safety checks completed this shift. Safety rounds completed hourly.  IV sites checked Q2+remains WDL. Medications administered as ordered with no signs/symptoms of adverse reactions. Fall & skin precautions in place. Any issues endorsed to oncoming RN for follow up.

## 2023-02-21 NOTE — H&P ADULT - ATTENDING COMMENTS
66yo w/ PMHx of breast ca in remission (s/p right lumpectomy and RT 9/2016, s/p letrozole), HTN, T2DM, and lumbar stenosis presenting with abdominal pain, nausea, vomiting, and anorexia x3 day, imaging suggestive of small bowel enteritis, pt now appears  comfortable in no acute distress, continue with supportive care, advance diet as tolerated, hold antibiotics for now as suspect viral enteritis more than bacterial, rest of plan as outlined above

## 2023-02-21 NOTE — DISCHARGE NOTE PROVIDER - NSDCCPCAREPLAN_GEN_ALL_CORE_FT
PRINCIPAL DISCHARGE DIAGNOSIS  Diagnosis: Gastroenteritis  Assessment and Plan of Treatment: Seen on CT scan of your abdomen. Contine with supportivr care. Advance diet as tolerated. Please follow up with PCP upon discharge.      SECONDARY DISCHARGE DIAGNOSES  Diagnosis: Type 2 diabetes mellitus  Assessment and Plan of Treatment: HgA1C this admission. (6.9)  Make sure you get your HgA1c checked every three months.  If you take oral diabetes medications, check your blood glucose two times a day.  If you take insulin, check your blood glucose before meals and at bedtime.  It's important not to skip any meals.  Keep a log of your blood glucose results and always take it with you to your doctor appointments.  Keep a list of your current medications including injectables and over the counter medications and bring this medication list with you to all your doctor appointments.  If you have not seen your ophthalmologist this year call for appointment.  Check your feet daily for redness, sores, or openings. Do not self treat. If no improvement in two days call your primary care physician for an appointment.  Low blood sugar (hypoglycemia) is a blood sugar below 70mg/dl. Check your blood sugar if you feel signs/symptoms of hypoglycemia. If your blood sugar is below 70 take 15 grams of carbohydrates (ex 4 oz of apple juice, 3-4 glucose tablets, or 4-6 oz of regular soda) wait 15 minutes and repeat blood sugar to make sure it comes up above 70.  If your blood sugar is above 70 and you are due for a meal, have a meal.  If you are not due for a meal have a snack.  This snack helps keeps your blood sugar at a safe range.      Diagnosis: Hypertension  Assessment and Plan of Treatment: Low salt diet  Activity as tolerated.  Take all medication as prescribed.  Follow up with your medical doctor for routine blood pressure monitoring at your next visit.  Notify your doctor if you have any of the following symptoms:   Dizziness, Lightheadedness, Blurry vision, Headache, Chest pain, Shortness of breath      Diagnosis: Hyperlipidemia  Assessment and Plan of Treatment: Continue with your cholesterol medications. Eat a heart healthy diet that is low in saturated fats and salt, and includes whole grains, fruits, vegetables and lean protein; exercise regularly (consult with your physician or cardiologist first); maintain a heart healthy weight; if you smoke - quit (A resource to help you stop smoking is the [facility] – phone number 772-422-0055.). Continue to follow with your primary physician or cardiologist.  Seek medical help for dizziness, Lightheadedness, Blurry vision, Headache, Chest pain, Shortness of breath       PRINCIPAL DISCHARGE DIAGNOSIS  Diagnosis: Gastroenteritis  Assessment and Plan of Treatment: Seen on CT scan of your abdomen. Contine with supportive care. Advance diet as tolerated. Please follow up with PCP/GI upon discharge.      SECONDARY DISCHARGE DIAGNOSES  Diagnosis: Type 2 diabetes mellitus  Assessment and Plan of Treatment: HgA1C this admission. (6.9)  Make sure you get your HgA1c checked every three months.  If you take oral diabetes medications, check your blood glucose two times a day.  If you take insulin, check your blood glucose before meals and at bedtime.  It's important not to skip any meals.  Keep a log of your blood glucose results and always take it with you to your doctor appointments.  Keep a list of your current medications including injectables and over the counter medications and bring this medication list with you to all your doctor appointments.  If you have not seen your ophthalmologist this year call for appointment.  Check your feet daily for redness, sores, or openings. Do not self treat. If no improvement in two days call your primary care physician for an appointment.  Low blood sugar (hypoglycemia) is a blood sugar below 70mg/dl. Check your blood sugar if you feel signs/symptoms of hypoglycemia. If your blood sugar is below 70 take 15 grams of carbohydrates (ex 4 oz of apple juice, 3-4 glucose tablets, or 4-6 oz of regular soda) wait 15 minutes and repeat blood sugar to make sure it comes up above 70.  If your blood sugar is above 70 and you are due for a meal, have a meal.  If you are not due for a meal have a snack.  This snack helps keeps your blood sugar at a safe range.      Diagnosis: Hypertension  Assessment and Plan of Treatment: Low salt diet  Activity as tolerated.  Take all medication as prescribed.  Follow up with your medical doctor for routine blood pressure monitoring at your next visit.  Notify your doctor if you have any of the following symptoms:   Dizziness, Lightheadedness, Blurry vision, Headache, Chest pain, Shortness of breath      Diagnosis: Hyperlipidemia  Assessment and Plan of Treatment: Continue with your cholesterol medications. Eat a heart healthy diet that is low in saturated fats and salt, and includes whole grains, fruits, vegetables and lean protein; exercise regularly (consult with your physician or cardiologist first); maintain a heart healthy weight; if you smoke - quit (A resource to help you stop smoking is the [facility] – phone number 808-054-1296.). Continue to follow with your primary physician or cardiologist.  Seek medical help for dizziness, Lightheadedness, Blurry vision, Headache, Chest pain, Shortness of breath

## 2023-02-21 NOTE — DISCHARGE NOTE NURSING/CASE MANAGEMENT/SOCIAL WORK - PATIENT PORTAL LINK FT
You can access the FollowMyHealth Patient Portal offered by [facility] by registering at the following website: [email or web address]. By joining [employer]’s FollowMyHealth portal, you will also be able to view your health information using other applications (apps) compatible with our system.

## 2023-02-21 NOTE — H&P ADULT - PROBLEM SELECTOR PLAN 2
- hold home oral agents Steglatro 15mg and metformin 500mg BID while inpatient  - low dose correctional scale  - check a1c

## 2023-02-21 NOTE — CHART NOTE - NSCHARTNOTEFT_GEN_A_CORE
Renzo seen and evaluated. Tolerating diet and clears. Had 1/2 turkey sandwich. Mild abdominal pain, improving. No fever. CT reviewed with patient + enteritis. If patient tolerates lunch can be discharged off of antibiotics for enteritis.   d/c planning 32 minutes.

## 2023-02-21 NOTE — DISCHARGE NOTE PROVIDER - PROVIDER TOKENS
PROVIDER:[TOKEN:[[id number]]] PROVIDER:[TOKEN:[[id number]]],PROVIDER:[TOKEN:[[id number]]]

## 2023-02-21 NOTE — H&P ADULT - NSICDXPASTMEDICALHX_GEN_ALL_CORE_FT
PAST MEDICAL HISTORY:  History of right breast cancer     Hypertension     Lumbar stenosis     Type 2 diabetes mellitus

## 2023-02-21 NOTE — DISCHARGE NOTE PROVIDER - NSDCMRMEDTOKEN_GEN_ALL_CORE_FT
candesartan 32 mg oral tablet: 1 tab(s) orally once a day  ferrous sulfate 324 mg (65 mg elemental iron) oral delayed release tablet: 1 tab(s) orally once a day  ibandronate 150 mg oral tablet: 1 tab(s) orally once a month  metFORMIN 500 mg oral tablet: 1 tab(s) orally 2 times a day  omeprazole 40 mg oral delayed release capsule: 1 cap(s) orally once a day  pravastatin 40 mg oral tablet: 1 tab(s) orally once a day  Steglatro 15 mg oral tablet: 1 tab(s) orally once a day (in the morning)

## 2023-02-21 NOTE — H&P ADULT - ASSESSMENT
67F with PMH breast ca (s/p right lumpectomy and RT 9/2016, s/p letrozole), HTN, T2DM, and lumbar stenosis presenting with abdominal pain, nausea, vomiting, and anorexia x3 days, admitted for decreased PO intake likely secondary gastroenteritis.  67F with PMH breast ca in remission (s/p right lumpectomy and RT 9/2016, s/p letrozole), HTN, T2DM, and lumbar stenosis presenting with abdominal pain, nausea, vomiting, and anorexia x3 days, admitted for decreased PO intake likely secondary gastroenteritis.

## 2023-02-21 NOTE — ED ADULT NURSE NOTE - OBJECTIVE STATEMENT
Pt presents to the ED A&Ox4 co epigastric abdominal pain r/t lower abdominal pain x 3 days. Pt hx hysterectomy. Endorses NV, and unable to pass gas. Denies fevers, chills. shortness of breath, difficulty breathing.

## 2023-02-21 NOTE — H&P ADULT - PROBLEM SELECTOR PLAN 1
p/w abd pain, n/v x 3 days w/ bandemia 18%, otherwise unremarkable labs, afebrile, benign exam, imaging suggestive of enteritis. likely viral gastroenteritis  - CT A/P w/ IV con w/ mildly thickened SB loops in LLQ suggestive of nonspecific enteritis. s/p hysterectomy. No SB obstruction   - lipase 41   - s/p 1L IVF and tylenol in ED     - f/u covid PCR   - c/w supportive management  - c/w home PPI  - clear liquid diet, advance as tolerated   - s/p 1L IVF, encourage PO intake. if cannot tolerate then mIVF   - tylenol PRN pain   - zofran 4mg IV q8h PRN nausea () p/w abd pain, n/v x 3 days w/ bandemia 18%, otherwise unremarkable labs, afebrile, benign exam, imaging suggestive of enteritis. likely viral gastroenteritis  - CT A/P w/ IV con w/ mildly thickened SB loops in LLQ suggestive of nonspecific enteritis. s/p hysterectomy. No SB obstruction   - lipase 41   - s/p 1L IVF and tylenol in ED     - f/u covid PCR, blood cxs  - c/w supportive management  - would monitor off abx for now, can restart if becomes febrile or clinical condition worsens   - c/w home PPI  - clear liquid diet, advance as tolerated   - s/p 1L IVF, encourage PO intake. if cannot tolerate then mIVF   - tylenol PRN pain   - zofran 4mg IV q8h PRN nausea () p/w abd pain, n/v x 3 days w/ bandemia 18%, otherwise unremarkable labs, afebrile, benign exam, imaging suggestive of enteritis. likely viral gastroenteritis. not septic  - CT A/P w/ IV con w/ mildly thickened SB loops in LLQ suggestive of nonspecific enteritis. s/p hysterectomy. No SB obstruction   - lipase 41   - s/p 1L IVF, zosyn, and tylenol in ED     - f/u covid PCR, blood cxs  - c/w supportive management  - would monitor off abx for now, can restart if becomes febrile or clinical condition worsens   - c/w home PPI  - clear liquid diet, advance as tolerated   - s/p 1L IVF, encourage PO intake. if cannot tolerate then mIVF   - tylenol PRN pain   - zofran 4mg IV q8h PRN nausea ()

## 2023-02-21 NOTE — H&P ADULT - NSHPLABSRESULTS_GEN_ALL_CORE
.  LABS:                         15.4   5.61  )-----------( 247      ( 20 Feb 2023 22:05 )             47.3     02-20    141  |  102  |  11  ----------------------------<  129<H>  4.3   |  24  |  0.54    Ca    9.4      20 Feb 2023 22:05    TPro  7.6  /  Alb  4.2  /  TBili  0.5  /  DBili  x   /  AST  37  /  ALT  24  /  AlkPhos  85  02-20    PT/INR - ( 20 Feb 2023 22:05 )   PT: 14.6 sec;   INR: 1.27 ratio      PTT - ( 20 Feb 2023 22:05 )  PTT:25.3 sec      RADIOLOGY, EKG & ADDITIONAL TESTS: Reviewed.   - EKG NSR HR 89,       < from: CT Abdomen and Pelvis w/ IV Cont (02.20.23 @ 22:54) >      INTERPRETATION:  CLINICAL INFORMATION: Abdominal pain    COMPARISON: None.    CONTRAST/COMPLICATIONS:  IV Contrast: [id number]  90 cc administered   10 cc discarded  Oral Contrast: NONE  Complications: None reported at time of study completion    PROCEDURE:  CT of the Abdomen and Pelvis was performed.  Sagittal and coronal reformats were performed.    FINDINGS:  LOWER CHEST: Within normal limits.    LIVER: Within normal limits.  BILE DUCTS: Normal caliber.  GALLBLADDER: Within normal limits.  SPLEEN: Within normal limits.  PANCREAS: Within normal limits.  ADRENALS: Within normal limits.  KIDNEYS/URETERS: No renal stones or hydronephrosis.    BLADDER: Within normal limits.  REPRODUCTIVE ORGANS: Hysterectomy.    BOWEL: Small hiatal hernia. No bowel obstruction. Mildly thickened small   bowel loops in the left lower quadrant suggests nonspecific enteritis.   Small bowel anastomotic sutures in the right lower quadrant. Appendix is   normal. Mild fecal load.  PERITONEUM: No ascites.  VESSELS: Trace atherosclerotic changes. The celiac axis, superior   mesenteric artery, and inferior mesenteric artery are patent centrally.   The superior mesenteric vein and main portal vein are patent.  RETROPERITONEUM/LYMPH NODES: No lymphadenopathy.  ABDOMINAL WALL: Within normal limits.  BONES: Degenerative changes.    IMPRESSION:    Nonspecific enteritis.    Hysterectomy. Nosmall bowel obstruction.    --- End of Report ---     BENNIE MENDOZA MD; Resident Radiologist  This document has been electronically signed.    < end of copied text > Personally reviewed labs, imaging and EKG    EKG: normal sinus rhythm, no ischemic changes       LABS:                         15.4   5.61  )-----------( 247      ( 20 Feb 2023 22:05 )             47.3     02-20    141  |  102  |  11  ----------------------------<  129<H>  4.3   |  24  |  0.54    Ca    9.4      20 Feb 2023 22:05    TPro  7.6  /  Alb  4.2  /  TBili  0.5  /  DBili  x   /  AST  37  /  ALT  24  /  AlkPhos  85  02-20    PT/INR - ( 20 Feb 2023 22:05 )   PT: 14.6 sec;   INR: 1.27 ratio      PTT - ( 20 Feb 2023 22:05 )  PTT:25.3 sec      INTERPRETATION:  CLINICAL INFORMATION: Abdominal pain    COMPARISON: None.    CONTRAST/COMPLICATIONS:  IV Contrast: [id number]  90 cc administered   10 cc discarded  Oral Contrast: NONE  Complications: None reported at time of study completion    PROCEDURE:  CT of the Abdomen and Pelvis was performed.  Sagittal and coronal reformats were performed.    FINDINGS:  LOWER CHEST: Within normal limits.    LIVER: Within normal limits.  BILE DUCTS: Normal caliber.  GALLBLADDER: Within normal limits.  SPLEEN: Within normal limits.  PANCREAS: Within normal limits.  ADRENALS: Within normal limits.  KIDNEYS/URETERS: No renal stones or hydronephrosis.    BLADDER: Within normal limits.  REPRODUCTIVE ORGANS: Hysterectomy.    BOWEL: Small hiatal hernia. No bowel obstruction. Mildly thickened small   bowel loops in the left lower quadrant suggests nonspecific enteritis.   Small bowel anastomotic sutures in the right lower quadrant. Appendix is   normal. Mild fecal load.  PERITONEUM: No ascites.  VESSELS: Trace atherosclerotic changes. The celiac axis, superior   mesenteric artery, and inferior mesenteric artery are patent centrally.   The superior mesenteric vein and main portal vein are patent.  RETROPERITONEUM/LYMPH NODES: No lymphadenopathy.  ABDOMINAL WALL: Within normal limits.  BONES: Degenerative changes.    IMPRESSION:    Nonspecific enteritis.    Hysterectomy. Nosmall bowel obstruction.

## 2023-02-21 NOTE — DISCHARGE NOTE PROVIDER - HOSPITAL COURSE
67F with PMH breast ca in remission (s/p right lumpectomy and RT 9/2016, s/p letrozole), HTN, T2DM, and lumbar stenosis presenting with abdominal pain, nausea, vomiting, and anorexia x3 days, admitted for decreased PO intake likely secondary gastroenteritis.        Gastroenteritis.   ·  Plan: p/w abd pain, n/v x 3 days w/ bandemia 18%, otherwise unremarkable labs, afebrile, benign exam, imaging suggestive of enteritis. likely viral gastroenteritis. not septic  - CT A/P w/ IV con w/ mildly thickened SB loops in LLQ suggestive of nonspecific enteritis. s/p hysterectomy. No SB obstruction   - lipase 41   - s/p 1L IVF, zosyn, and tylenol in ED -, encourage PO intake. if cannot tolerate then IVF   - f/u covid PCR- > negative , blood cxs  - would monitor off abx for now, can restart if becomes febrile or clinical condition worsens   - c/w home PPI    - tylenol PRN pain , zofran 4mg IV q8h PRN nausea ().   clear liquid diet, advance as tolerated. PT had po challenge and  ______________(  )  - c/w supportive management    Type 2 diabetes mellitus.   ·  Plan: - hold home oral agents Steglatro 15mg and metformin 500mg BID while inpatient  - low dose correctional scale  - check a1c.(6.9)    HLD   pravastatin 40mg qd  --> atorvastatin 10mg qd while inpatient.      Pt medically stable for discharge and follow up with PCP     67F with PMH breast ca in remission (s/p right lumpectomy and RT 9/2016, s/p letrozole), HTN, T2DM, and lumbar stenosis presenting with abdominal pain, nausea, vomiting, and anorexia x3 days, admitted for decreased PO intake likely secondary gastroenteritis.        Gastroenteritis.   ·  Plan: p/w abd pain, n/v x 3 days w/ bandemia 18%, otherwise unremarkable labs, afebrile, benign exam, imaging suggestive of enteritis. likely viral gastroenteritis. not septic  - CT A/P w/ IV con w/ mildly thickened SB loops in LLQ suggestive of nonspecific enteritis. s/p hysterectomy. No SB obstruction   - lipase 41   - s/p 1L IVF, zosyn, and tylenol in ED -, encourage PO intake. if cannot tolerate then IVF   - f/u covid PCR- > negative , blood cxs  - would monitor off abx for now, can restart if becomes febrile or clinical condition worsens   - c/w home PPI  - tylenol PRN pain , zofran 4mg IV q8h PRN nausea ().   clear liquid diet, advance as tolerated. PT had po challenge and pt tolerated reg diet without complaint.  - symptoms much improved    Type 2 diabetes mellitus.   ·  Plan: - hold home oral agents Steglatro 15mg and metformin 500mg BID while inpatient  - low dose correctional scale  - check a1c.(6.9)    HLD   pravastatin 40mg qd  --> atorvastatin 10mg qd while inpatient.      Discussed discharge/dispo/med rec with Dr. Corea patient stable and medically cleared for discharge home with close follow up with PCP.     67F with PMH breast ca in remission (s/p right lumpectomy and RT 9/2016, s/p letrozole), HTN, T2DM, and lumbar stenosis presenting with abdominal pain, nausea, vomiting, and anorexia x3 days, admitted for decreased PO intake likely secondary gastroenteritis.        Gastroenteritis.   ·  Plan: p/w abd pain, n/v x 3 days w/ bandemia 18%, otherwise unremarkable labs, afebrile, benign exam, imaging suggestive of enteritis. likely viral gastroenteritis. not septic  - CT A/P w/ IV con w/ mildly thickened SB loops in LLQ suggestive of nonspecific enteritis. s/p hysterectomy. No SB obstruction   - lipase 41   - s/p 1L IVF, zosyn, and tylenol in ED -, encourage PO intake. if cannot tolerate then IVF   - f/u covid PCR- > negative , blood cxs  - monitor off abx  - c/w home PPI  - tylenol PRN pain , zofran 4mg IV q8h PRN nausea ().   clear liquid diet, advance as tolerated. PT had po challenge and pt tolerated reg diet without complaint.  - symptoms much improved    Type 2 diabetes mellitus.   ·  Plan: - hold home oral agents Steglatro 15mg and metformin 500mg BID while inpatient  - low dose correctional scale  - check a1c.(6.9)    HLD   pravastatin 40mg qd  --> atorvastatin 10mg qd while inpatient.      Discussed discharge/dispo/med rec with Dr. Corea patient stable and medically cleared for discharge home with close follow up with PCP and outpatient GI.

## 2023-02-21 NOTE — H&P ADULT - HISTORY OF PRESENT ILLNESS
67F with PMH breast ca (s/p right lumpectomy and RT 9/2016, s/p letrozole), HTN, T2DM, and lumbar stenosis presenting with abdominal pain x3 days. Reports 8/10 abdominal cramping, worse over R side with associated bloating, nausea, and vomiting x3. No appetite, not tolerating solids, but tolerating liquids PO. Denies any fever, diarrhea, CP, SOB, urinary sxs. Last BM yesterday. No sick contacts, recent travels, suspicious foods.     ED Course:   On arrival, VS afebrile, /78, , RR 20, SpO2 97%   Labs notable for bandemia 18.5%, elevated INR 1.27, electrolytes wnl, lipase wnl 41, VBG largely unremarkable   EKG NSR HR 89,   Imaging: CT A/P w/ IV con w/ mildly thickened SB loops in LLQ suggestive of nonspecific enteritis. s/p hysterectomy. No SB obstruction   Meds: Given tylenol 1g IVx1, zofran 4mg x1, zosyn, 1L LR bolus     Abdominal pain improved after tylenol, now reporting 2/10.  67F with PMH breast ca (s/p right lumpectomy and RT 9/2016, s/p letrozole), HTN, T2DM, and lumbar stenosis presenting with abdominal pain x3 days. Reports 8/10 abdominal cramping, worse over R side with associated bloating, nausea, and vomiting x3. No appetite, not tolerating solids, but tolerating liquids PO. Denies any fever, diarrhea, CP, SOB, urinary sxs. Last BM yesterday. No sick contacts, recent travels, suspicious foods. History of hysterectomy and vaginal prolapse surgery, no other abdominal surgeries.     ED Course:   On arrival, VS afebrile, /78, , RR 20, SpO2 97%   Labs notable for bandemia 18.5%, elevated INR 1.27, electrolytes wnl, lipase wnl 41, VBG largely unremarkable   EKG NSR HR 89,   Imaging: CT A/P w/ IV con w/ mildly thickened SB loops in LLQ suggestive of nonspecific enteritis. s/p hysterectomy. No SB obstruction   Meds: Given tylenol 1g IVx1, zofran 4mg x1, zosyn, 1L LR bolus     Abdominal pain improved after tylenol, now reporting 2/10.  67F with PMH breast ca in remission (s/p right lumpectomy and RT 9/2016, s/p letrozole), HTN, T2DM, and lumbar stenosis presenting with abdominal pain x3 days. Reports 8/10 abdominal cramping, worse over R side with associated bloating, nausea, and vomiting x3. No appetite, not tolerating solids, but tolerating liquids PO. Denies any fever, diarrhea, CP, SOB, urinary sxs. Last BM yesterday. No sick contacts, recent travels, suspicious foods. History of hysterectomy and vaginal prolapse surgery, no other abdominal surgeries.     ED Course:   On arrival, VS afebrile, /78, , RR 20, SpO2 97%   Labs notable for bandemia 18.5%, elevated INR 1.27, electrolytes wnl, lipase wnl 41, VBG largely unremarkable   EKG NSR HR 89,   Imaging: CT A/P w/ IV con w/ mildly thickened SB loops in LLQ suggestive of nonspecific enteritis. s/p hysterectomy. No SB obstruction   Meds: Given tylenol 1g IVx1, zofran 4mg x1, zosyn, 1L LR bolus     Abdominal pain improved after tylenol, now reporting 2/10.

## 2023-02-22 LAB
CULTURE RESULTS: SIGNIFICANT CHANGE UP
SPECIMEN SOURCE: SIGNIFICANT CHANGE UP

## 2023-02-26 LAB
CULTURE RESULTS: SIGNIFICANT CHANGE UP
CULTURE RESULTS: SIGNIFICANT CHANGE UP
SPECIMEN SOURCE: SIGNIFICANT CHANGE UP
SPECIMEN SOURCE: SIGNIFICANT CHANGE UP

## 2024-11-15 NOTE — DISCHARGE NOTE PROVIDER - DISCHARGE SERVICE FOR PATIENT
Discussed with pharmacy regarding medication options for patient's uncontrolled hypertension. Per cardiology and EP, she is only allowed to have ACE-I or ARBs. Informed us that nicardipine is not an option given her history of nifedipine allergy. Suggested to increase dose of losartan to 50 mg daily.       Electronically signed by Mildred Parikh MD on 11/15/2024 at 1:22 PM     on the discharge service for the patient. I have reviewed and made amendments to the documentation where necessary.